# Patient Record
Sex: FEMALE | Race: WHITE | NOT HISPANIC OR LATINO | Employment: FULL TIME | URBAN - METROPOLITAN AREA
[De-identification: names, ages, dates, MRNs, and addresses within clinical notes are randomized per-mention and may not be internally consistent; named-entity substitution may affect disease eponyms.]

---

## 2017-06-12 ENCOUNTER — ALLSCRIPTS OFFICE VISIT (OUTPATIENT)
Dept: OTHER | Facility: OTHER | Age: 37
End: 2017-06-12

## 2017-06-12 DIAGNOSIS — L72.0 EPIDERMAL CYST: ICD-10-CM

## 2017-06-12 DIAGNOSIS — M79.89 OTHER DISORDERS OF SOFT TISSUE: ICD-10-CM

## 2017-06-16 ENCOUNTER — HOSPITAL ENCOUNTER (OUTPATIENT)
Dept: RADIOLOGY | Age: 37
Discharge: HOME | End: 2017-06-16
Payer: COMMERCIAL

## 2017-06-16 DIAGNOSIS — M79.89 OTHER DISORDERS OF SOFT TISSUE: ICD-10-CM

## 2017-06-16 DIAGNOSIS — L72.0 EPIDERMAL CYST: ICD-10-CM

## 2017-06-16 PROCEDURE — 76705 ECHO EXAM OF ABDOMEN: CPT

## 2017-06-26 ENCOUNTER — ALLSCRIPTS OFFICE VISIT (OUTPATIENT)
Dept: OTHER | Facility: OTHER | Age: 37
End: 2017-06-26

## 2017-07-21 ENCOUNTER — ALLSCRIPTS OFFICE VISIT (OUTPATIENT)
Dept: OTHER | Facility: OTHER | Age: 37
End: 2017-07-21

## 2017-07-21 DIAGNOSIS — M79.89 OTHER DISORDERS OF SOFT TISSUE: ICD-10-CM

## 2017-07-21 PROCEDURE — 88304 TISSUE EXAM BY PATHOLOGIST: CPT | Performed by: SURGERY

## 2017-07-22 ENCOUNTER — LAB REQUISITION (OUTPATIENT)
Dept: LAB | Facility: HOSPITAL | Age: 37
End: 2017-07-22
Payer: COMMERCIAL

## 2017-07-22 DIAGNOSIS — M79.89 OTHER DISORDERS OF SOFT TISSUE: ICD-10-CM

## 2017-08-01 ENCOUNTER — GENERIC CONVERSION - ENCOUNTER (OUTPATIENT)
Dept: OTHER | Facility: OTHER | Age: 37
End: 2017-08-01

## 2017-08-07 ENCOUNTER — ALLSCRIPTS OFFICE VISIT (OUTPATIENT)
Dept: OTHER | Facility: OTHER | Age: 37
End: 2017-08-07

## 2018-01-12 VITALS
DIASTOLIC BLOOD PRESSURE: 77 MMHG | BODY MASS INDEX: 20.29 KG/M2 | WEIGHT: 114.5 LBS | HEART RATE: 72 BPM | RESPIRATION RATE: 12 BRPM | TEMPERATURE: 99.4 F | SYSTOLIC BLOOD PRESSURE: 118 MMHG | HEIGHT: 63 IN

## 2018-01-12 VITALS
WEIGHT: 117.13 LBS | SYSTOLIC BLOOD PRESSURE: 110 MMHG | HEIGHT: 63 IN | HEART RATE: 80 BPM | TEMPERATURE: 98.5 F | DIASTOLIC BLOOD PRESSURE: 72 MMHG | RESPIRATION RATE: 14 BRPM | BODY MASS INDEX: 20.75 KG/M2

## 2018-01-13 VITALS
RESPIRATION RATE: 16 BRPM | HEIGHT: 63 IN | DIASTOLIC BLOOD PRESSURE: 70 MMHG | SYSTOLIC BLOOD PRESSURE: 120 MMHG | BODY MASS INDEX: 20.77 KG/M2 | WEIGHT: 117.19 LBS | HEART RATE: 80 BPM

## 2018-01-14 VITALS
WEIGHT: 115.38 LBS | HEIGHT: 63 IN | BODY MASS INDEX: 20.45 KG/M2 | RESPIRATION RATE: 16 BRPM | HEART RATE: 78 BPM | SYSTOLIC BLOOD PRESSURE: 118 MMHG | TEMPERATURE: 98.3 F | DIASTOLIC BLOOD PRESSURE: 72 MMHG

## 2018-01-17 NOTE — MISCELLANEOUS
Message   Recorded as Task   Date: 08/01/2017 08:46 AM, Created By: Jennifer Andre   Task Name: Call Back   [facility]   Regarding Patient: Norma Rajput, Status: Active   CommentEstelle Belcher - 01 Aug 2017 8:46 AM     TASK CREATED  Please call with pathology  Benign cyst, as expected  Jose Chin - 01 Aug 2017 2:49 PM     TASK EDITED  Called and left a message for patient to return a call to the office  Cyst-benign   POPV-8/7/17 at 9:15a   Patricia Zuñiga - 01 Aug 2017 2:57 PM     TASK EDITED  Received return call from patient  Pathology results were given and understood  Confirmed [facility] appt with patient  Active Problems    1  Acute pharyngitis, unspecified etiology (462) (J02 9)   2  Anxiety (300 00) (F41 9)   3  Colon cancer screening (V76 51) (Z12 11)   4  Cyst of soft tissue (729 99) (M79 89)   5  Encounter for Papanicolaou smear for cervical cancer screening (V76 2) (Z12 4)   6  Epidermoid cyst of skin of chest (706 2) (L72 0)   7  Insomnia (780 52) (G47 00)   8  Lymphadenopathy (785 6) (R59 1)   9  Need for diphtheria-tetanus-pertussis (Tdap) vaccine, adult/adolescent (V06 1) (Z23)   10  Scalp cyst (706 2) (L72 9)   11  Swollen lymph nodes (785 6) (R59 9)    Current Meds   1  Cephalexin 500 MG Oral Capsule; TAKE 1 CAPSULE 4 TIMES DAILY UNTIL GONE;   Therapy: 43YAJ8206 to (Evaluate:11Aug2017)  Requested for: 51Jts7320; Last   Rx:[id number] Ordered   2  Citalopram Hydrobromide 10 MG Oral Tablet; TAKE 1 TABLET DAILY; Therapy: 61AJV3210 to (Essei العراقي)  Requested for: 92CIJ5981; Last   Rx:[id number] Ordered   3  TraZODone HCl - 50 MG Oral Tablet; TAKE 1 TABLET AT BEDTIME; Therapy: 28JZH5416 to (Evaluate:96Hcy7049)  Requested for: 59MNU1606; Last   Rx:[id number] Ordered    Allergies    1  Compazine TABS   2   Phenergan SOLN    Signatures   Electronically signed by : Amie Guerra, ; Aug  1 2017  2:57PM EST                       (Author)